# Patient Record
Sex: MALE
[De-identification: names, ages, dates, MRNs, and addresses within clinical notes are randomized per-mention and may not be internally consistent; named-entity substitution may affect disease eponyms.]

---

## 2022-07-25 ENCOUNTER — APPOINTMENT (OUTPATIENT)
Dept: ORTHOPEDIC SURGERY | Facility: CLINIC | Age: 32
End: 2022-07-25

## 2022-07-25 VITALS — WEIGHT: 185 LBS | BODY MASS INDEX: 25.9 KG/M2 | HEIGHT: 71 IN

## 2022-07-25 DIAGNOSIS — S62.342A NONDISPLACED FRACTURE OF BASE OF THIRD METACARPAL BONE, RIGHT HAND, INITIAL ENCOUNTER FOR CLOSED FRACTURE: ICD-10-CM

## 2022-07-25 PROCEDURE — 26600 TREAT METACARPAL FRACTURE: CPT

## 2022-07-25 PROCEDURE — 99203 OFFICE O/P NEW LOW 30 MIN: CPT | Mod: 57

## 2022-07-25 PROCEDURE — 73130 X-RAY EXAM OF HAND: CPT | Mod: RT

## 2022-07-26 NOTE — HISTORY OF PRESENT ILLNESS
[Sudden] : sudden [8] : 8 [6] : 6 [Dull/Aching] : dull/aching [Throbbing] : throbbing [de-identified] : R hand injury \par Pain and swelling  [] : no [FreeTextEntry1] :  right hand  [FreeTextEntry3] : 07/22/22 [FreeTextEntry5] : he punch a counter top  [FreeTextEntry9] : brace  [de-identified] : activity  [de-identified] : 07/23/22 [de-identified] : xray [de-identified] : [facility]

## 2022-07-26 NOTE — PHYSICAL EXAM
[de-identified] : R hand\par Tender 3rd MC base\par Stiffness\par No deformity\par NVI\par \par Xrays 3rd MC base fracture nondisplaced

## 2022-08-15 ENCOUNTER — APPOINTMENT (OUTPATIENT)
Dept: ORTHOPEDIC SURGERY | Facility: CLINIC | Age: 32
End: 2022-08-15

## 2023-12-05 ENCOUNTER — NON-APPOINTMENT (OUTPATIENT)
Age: 33
End: 2023-12-05

## 2023-12-05 ENCOUNTER — APPOINTMENT (OUTPATIENT)
Dept: INTERNAL MEDICINE | Facility: CLINIC | Age: 33
End: 2023-12-05
Payer: COMMERCIAL

## 2023-12-05 VITALS
TEMPERATURE: 98.2 F | SYSTOLIC BLOOD PRESSURE: 90 MMHG | OXYGEN SATURATION: 99 % | WEIGHT: 182 LBS | HEIGHT: 71 IN | HEART RATE: 65 BPM | DIASTOLIC BLOOD PRESSURE: 60 MMHG | BODY MASS INDEX: 25.48 KG/M2

## 2023-12-05 DIAGNOSIS — Z00.00 ENCOUNTER FOR GENERAL ADULT MEDICAL EXAMINATION W/OUT ABNORMAL FINDINGS: ICD-10-CM

## 2023-12-05 DIAGNOSIS — Z13.228 ENCOUNTER FOR SCREENING FOR OTHER METABOLIC DISORDERS: ICD-10-CM

## 2023-12-05 PROCEDURE — 93000 ELECTROCARDIOGRAM COMPLETE: CPT

## 2023-12-05 PROCEDURE — 99385 PREV VISIT NEW AGE 18-39: CPT | Mod: 25

## 2023-12-06 LAB
ALBUMIN SERPL ELPH-MCNC: 4.8 G/DL
ALP BLD-CCNC: 58 U/L
ALT SERPL-CCNC: 14 U/L
ANION GAP SERPL CALC-SCNC: 8 MMOL/L
AST SERPL-CCNC: 16 U/L
BILIRUB DIRECT SERPL-MCNC: 0.2 MG/DL
BILIRUB INDIRECT SERPL-MCNC: 0.5 MG/DL
BILIRUB SERPL-MCNC: 0.6 MG/DL
BUN SERPL-MCNC: 6 MG/DL
CALCIUM SERPL-MCNC: 9.7 MG/DL
CHLORIDE SERPL-SCNC: 105 MMOL/L
CHOLEST SERPL-MCNC: 192 MG/DL
CO2 SERPL-SCNC: 26 MMOL/L
CREAT SERPL-MCNC: 0.78 MG/DL
EGFR: 122 ML/MIN/1.73M2
ESTIMATED AVERAGE GLUCOSE: 103 MG/DL
GLUCOSE SERPL-MCNC: 88 MG/DL
HBA1C MFR BLD HPLC: 5.2 %
HCT VFR BLD CALC: 40.8 %
HDLC SERPL-MCNC: 55 MG/DL
HGB BLD-MCNC: 13.8 G/DL
LDLC SERPL CALC-MCNC: 125 MG/DL
MCHC RBC-ENTMCNC: 30.7 PG
MCHC RBC-ENTMCNC: 33.8 GM/DL
MCV RBC AUTO: 90.7 FL
NONHDLC SERPL-MCNC: 137 MG/DL
PLATELET # BLD AUTO: 209 K/UL
POTASSIUM SERPL-SCNC: 4.2 MMOL/L
PROT SERPL-MCNC: 8 G/DL
RBC # BLD: 4.5 M/UL
RBC # FLD: 12.7 %
SODIUM SERPL-SCNC: 139 MMOL/L
TRIGL SERPL-MCNC: 64 MG/DL
TSH SERPL-ACNC: 2.61 UIU/ML
WBC # FLD AUTO: 5.7 K/UL

## 2025-04-28 ENCOUNTER — APPOINTMENT (OUTPATIENT)
Dept: INTERNAL MEDICINE | Facility: CLINIC | Age: 35
End: 2025-04-28

## 2025-04-28 VITALS
SYSTOLIC BLOOD PRESSURE: 110 MMHG | OXYGEN SATURATION: 99 % | HEART RATE: 74 BPM | DIASTOLIC BLOOD PRESSURE: 70 MMHG | TEMPERATURE: 98.4 F | HEIGHT: 71 IN | WEIGHT: 190 LBS | BODY MASS INDEX: 26.6 KG/M2

## 2025-04-28 DIAGNOSIS — M25.519 PAIN IN UNSPECIFIED SHOULDER: ICD-10-CM

## 2025-04-28 DIAGNOSIS — Z13.228 ENCOUNTER FOR SCREENING FOR OTHER METABOLIC DISORDERS: ICD-10-CM

## 2025-04-28 PROCEDURE — 99213 OFFICE O/P EST LOW 20 MIN: CPT

## 2025-04-28 PROCEDURE — G2211 COMPLEX E/M VISIT ADD ON: CPT | Mod: NC

## 2025-04-29 LAB
25(OH)D3 SERPL-MCNC: 22.7 NG/ML
ALBUMIN SERPL ELPH-MCNC: 4.6 G/DL
ALP BLD-CCNC: 67 U/L
ALT SERPL-CCNC: 27 U/L
ANION GAP SERPL CALC-SCNC: 13 MMOL/L
APPEARANCE: CLEAR
AST SERPL-CCNC: 26 U/L
BASOPHILS # BLD AUTO: 0.03 K/UL
BASOPHILS NFR BLD AUTO: 0.5 %
BILIRUB DIRECT SERPL-MCNC: 0.2 MG/DL
BILIRUB INDIRECT SERPL-MCNC: 0.5 MG/DL
BILIRUB SERPL-MCNC: 0.7 MG/DL
BILIRUBIN URINE: NEGATIVE
BLOOD URINE: NEGATIVE
BUN SERPL-MCNC: 7 MG/DL
CALCIUM SERPL-MCNC: 9.2 MG/DL
CHLORIDE SERPL-SCNC: 102 MMOL/L
CHOLEST SERPL-MCNC: 219 MG/DL
CO2 SERPL-SCNC: 24 MMOL/L
COLOR: YELLOW
CREAT SERPL-MCNC: 0.91 MG/DL
EGFRCR SERPLBLD CKD-EPI 2021: 114 ML/MIN/1.73M2
EOSINOPHIL # BLD AUTO: 0.01 K/UL
EOSINOPHIL NFR BLD AUTO: 0.2 %
ESTIMATED AVERAGE GLUCOSE: 103 MG/DL
GLUCOSE QUALITATIVE U: NEGATIVE MG/DL
GLUCOSE SERPL-MCNC: 84 MG/DL
HBA1C MFR BLD HPLC: 5.2 %
HCT VFR BLD CALC: 41.4 %
HDLC SERPL-MCNC: 67 MG/DL
HGB BLD-MCNC: 14 G/DL
IMM GRANULOCYTES NFR BLD AUTO: 0.2 %
KETONES URINE: NEGATIVE MG/DL
LDLC SERPL-MCNC: 143 MG/DL
LEUKOCYTE ESTERASE URINE: NEGATIVE
LYMPHOCYTES # BLD AUTO: 1.63 K/UL
LYMPHOCYTES NFR BLD AUTO: 27.5 %
MAGNESIUM SERPL-MCNC: 1.8 MG/DL
MAN DIFF?: NORMAL
MCHC RBC-ENTMCNC: 30.2 PG
MCHC RBC-ENTMCNC: 33.8 G/DL
MCV RBC AUTO: 89.2 FL
MONOCYTES # BLD AUTO: 0.53 K/UL
MONOCYTES NFR BLD AUTO: 9 %
NEUTROPHILS # BLD AUTO: 3.71 K/UL
NEUTROPHILS NFR BLD AUTO: 62.6 %
NITRITE URINE: NEGATIVE
NONHDLC SERPL-MCNC: 152 MG/DL
PH URINE: 7.5
PLATELET # BLD AUTO: 231 K/UL
POTASSIUM SERPL-SCNC: 3.6 MMOL/L
PROT SERPL-MCNC: 7.5 G/DL
PROTEIN URINE: NEGATIVE MG/DL
RBC # BLD: 4.64 M/UL
RBC # FLD: 13 %
SODIUM SERPL-SCNC: 139 MMOL/L
SPECIFIC GRAVITY URINE: 1.01
T4 FREE SERPL-MCNC: 1.4 NG/DL
TRIGL SERPL-MCNC: 51 MG/DL
TSH SERPL-ACNC: 1.91 UIU/ML
UROBILINOGEN URINE: 0.2 MG/DL
VIT B12 SERPL-MCNC: 545 PG/ML
WBC # FLD AUTO: 5.92 K/UL